# Patient Record
Sex: FEMALE | Race: WHITE | NOT HISPANIC OR LATINO | Employment: OTHER | URBAN - METROPOLITAN AREA
[De-identification: names, ages, dates, MRNs, and addresses within clinical notes are randomized per-mention and may not be internally consistent; named-entity substitution may affect disease eponyms.]

---

## 2017-02-09 ENCOUNTER — PREPPED CHART (OUTPATIENT)
Dept: URBAN - METROPOLITAN AREA CLINIC 39 | Facility: CLINIC | Age: 82
End: 2017-02-09

## 2017-03-24 ASSESSMENT — VISUAL ACUITY
OD_SC: 20/40
OS_SC: 20/40

## 2017-03-24 ASSESSMENT — TONOMETRY: OS_IOP_MMHG: 13

## 2017-03-28 ENCOUNTER — ESTABLISHED PATIENT (OUTPATIENT)
Dept: URBAN - METROPOLITAN AREA CLINIC 39 | Facility: CLINIC | Age: 82
End: 2017-03-28

## 2017-03-28 DIAGNOSIS — Z96.1: ICD-10-CM

## 2017-03-28 DIAGNOSIS — H35.3212: ICD-10-CM

## 2017-03-28 DIAGNOSIS — H40.1133: ICD-10-CM

## 2017-03-28 DIAGNOSIS — H35.3221: ICD-10-CM

## 2017-03-28 DIAGNOSIS — H43.812: ICD-10-CM

## 2017-03-28 PROCEDURE — 0517F GLAUCOMA PLAN OF CARE DOCD: CPT

## 2017-03-28 PROCEDURE — G8428 CUR MEDS NOT DOCUMENT: HCPCS

## 2017-03-28 PROCEDURE — 4177F TALK PT/CRGVR RE AREDS PREV: CPT

## 2017-03-28 PROCEDURE — 2027F OPTIC NERVE HEAD EVAL DONE: CPT

## 2017-03-28 PROCEDURE — 92012 INTRM OPH EXAM EST PATIENT: CPT

## 2017-03-28 PROCEDURE — G8756 NO BP MEASURE DOC: HCPCS

## 2017-03-28 PROCEDURE — 3284F IOP RED >=15% PRE-NTRV LVL: CPT

## 2017-03-28 PROCEDURE — 92134 CPTRZ OPH DX IMG PST SGM RTA: CPT

## 2017-03-28 PROCEDURE — 1036F TOBACCO NON-USER: CPT

## 2017-03-28 PROCEDURE — 3285F IOP DOWN <15% OF PRE-SVC LVL: CPT

## 2017-03-28 PROCEDURE — 2019F DILATED MACUL EXAM DONE: CPT

## 2017-03-28 ASSESSMENT — TONOMETRY
OS_IOP_MMHG: 14
OD_IOP_MMHG: 16

## 2017-03-28 ASSESSMENT — VISUAL ACUITY
OS_PH: 20/50-2
OD_SC: 20/40+2
OS_SC: J8
OS_SC: 20/200+1

## 2017-04-13 ENCOUNTER — ESTABLISHED PATIENT (OUTPATIENT)
Dept: URBAN - METROPOLITAN AREA CLINIC 39 | Facility: CLINIC | Age: 82
End: 2017-04-13

## 2017-04-13 DIAGNOSIS — H35.3211: ICD-10-CM

## 2017-04-13 PROCEDURE — 67028 INJECTION EYE DRUG: CPT

## 2017-04-13 ASSESSMENT — VISUAL ACUITY
OD_SC: 20/50+2
OD_SC: J12
OS_SC: 20/70-2
OS_SC: J4
OS_PH: 20/40-2

## 2017-04-13 ASSESSMENT — TONOMETRY: OD_IOP_MMHG: 14

## 2017-05-24 ENCOUNTER — ESTABLISHED PATIENT (OUTPATIENT)
Dept: URBAN - METROPOLITAN AREA CLINIC 39 | Facility: CLINIC | Age: 82
End: 2017-05-24

## 2017-05-24 DIAGNOSIS — H40.1133: ICD-10-CM

## 2017-05-24 DIAGNOSIS — H43.812: ICD-10-CM

## 2017-05-24 DIAGNOSIS — H35.3212: ICD-10-CM

## 2017-05-24 DIAGNOSIS — H35.3221: ICD-10-CM

## 2017-05-24 PROCEDURE — 92012 INTRM OPH EXAM EST PATIENT: CPT

## 2017-05-24 PROCEDURE — 9222650 BILAT EXTENDED OPHTHALMOSCOPY, F/U

## 2017-05-24 PROCEDURE — 92134 CPTRZ OPH DX IMG PST SGM RTA: CPT

## 2017-05-24 PROCEDURE — 67028 INJECTION EYE DRUG: CPT

## 2017-05-24 ASSESSMENT — TONOMETRY
OD_IOP_MMHG: 15
OS_IOP_MMHG: 16

## 2017-05-24 ASSESSMENT — VISUAL ACUITY
OD_SC: 20/30-2
OS_SC: 20/50

## 2017-06-21 ENCOUNTER — ESTABLISHED PATIENT (OUTPATIENT)
Dept: URBAN - METROPOLITAN AREA CLINIC 39 | Facility: CLINIC | Age: 82
End: 2017-06-21

## 2017-06-21 DIAGNOSIS — H35.3212: ICD-10-CM

## 2017-06-21 DIAGNOSIS — H35.3221: ICD-10-CM

## 2017-06-21 PROCEDURE — 6702850 BILATERAL INTRAVITREAL INJECTION

## 2017-06-21 ASSESSMENT — TONOMETRY
OD_IOP_MMHG: 10
OS_IOP_MMHG: 12

## 2017-06-21 ASSESSMENT — VISUAL ACUITY
OD_SC: 20/30-1+1
OS_SC: 20/80-1
OS_PH: 20/40+1

## 2017-08-02 ENCOUNTER — ESTABLISHED PATIENT (OUTPATIENT)
Dept: URBAN - METROPOLITAN AREA CLINIC 39 | Facility: CLINIC | Age: 82
End: 2017-08-02

## 2017-08-02 DIAGNOSIS — H43.812: ICD-10-CM

## 2017-08-02 DIAGNOSIS — H35.3212: ICD-10-CM

## 2017-08-02 PROCEDURE — 92012 INTRM OPH EXAM EST PATIENT: CPT

## 2017-08-02 PROCEDURE — 92134 CPTRZ OPH DX IMG PST SGM RTA: CPT

## 2017-08-02 ASSESSMENT — VISUAL ACUITY
OS_PH: 20/40-1
OS_SC: 20/70-1
OD_SC: 20/30-2

## 2017-08-02 ASSESSMENT — TONOMETRY
OD_IOP_MMHG: 12
OS_IOP_MMHG: 13

## 2017-08-21 ENCOUNTER — ESTABLISHED PATIENT (OUTPATIENT)
Dept: URBAN - METROPOLITAN AREA CLINIC 35 | Facility: CLINIC | Age: 82
End: 2017-08-21

## 2017-08-21 DIAGNOSIS — H43.812: ICD-10-CM

## 2017-08-21 DIAGNOSIS — H35.3211: ICD-10-CM

## 2017-08-21 DIAGNOSIS — H35.3221: ICD-10-CM

## 2017-08-21 PROCEDURE — 92134 CPTRZ OPH DX IMG PST SGM RTA: CPT

## 2017-08-21 PROCEDURE — 6702850 BILATERAL INTRAVITREAL INJECTION

## 2017-08-21 PROCEDURE — 92014 COMPRE OPH EXAM EST PT 1/>: CPT

## 2017-08-21 ASSESSMENT — VISUAL ACUITY
OD_SC: 20/30-1
OS_SC: 20/80
OS_PH: 20/40+1

## 2017-08-21 ASSESSMENT — TONOMETRY
OS_IOP_MMHG: 19
OD_IOP_MMHG: 14

## 2017-10-23 ENCOUNTER — ESTABLISHED PATIENT (OUTPATIENT)
Dept: URBAN - METROPOLITAN AREA CLINIC 39 | Facility: CLINIC | Age: 82
End: 2017-10-23

## 2017-10-23 DIAGNOSIS — H35.3212: ICD-10-CM

## 2017-10-23 DIAGNOSIS — H35.363: ICD-10-CM

## 2017-10-23 DIAGNOSIS — H43.812: ICD-10-CM

## 2017-10-23 DIAGNOSIS — H35.3221: ICD-10-CM

## 2017-10-23 PROCEDURE — 6702850 BILATERAL INTRAVITREAL INJECTION

## 2017-10-23 PROCEDURE — 92134 CPTRZ OPH DX IMG PST SGM RTA: CPT

## 2017-10-23 PROCEDURE — 9222650 BILAT EXTENDED OPHTHALMOSCOPY, F/U

## 2017-10-23 PROCEDURE — 92012 INTRM OPH EXAM EST PATIENT: CPT

## 2017-10-23 ASSESSMENT — VISUAL ACUITY
OD_SC: 20/40
OS_SC: J6
OS_PH: 20/50-2
OS_SC: 20/80-1
OD_SC: J10
OD_PH: 20/30-2

## 2017-10-23 ASSESSMENT — TONOMETRY
OS_IOP_MMHG: 16
OD_IOP_MMHG: 13

## 2017-10-24 ENCOUNTER — IOP CHECK (OUTPATIENT)
Dept: URBAN - METROPOLITAN AREA CLINIC 35 | Facility: CLINIC | Age: 82
End: 2017-10-24

## 2017-10-24 DIAGNOSIS — H40.1133: ICD-10-CM

## 2017-10-24 PROCEDURE — 92012 INTRM OPH EXAM EST PATIENT: CPT

## 2017-10-24 PROCEDURE — 92083 EXTENDED VISUAL FIELD XM: CPT

## 2017-10-24 ASSESSMENT — VISUAL ACUITY
OS_SC: 20/80
OD_PH: 20/30
OS_PH: 20/50-2
OD_SC: 20/40

## 2017-10-24 ASSESSMENT — TONOMETRY
OS_IOP_MMHG: 14
OD_IOP_MMHG: 12

## 2017-11-20 ENCOUNTER — ESTABLISHED PATIENT (OUTPATIENT)
Dept: URBAN - METROPOLITAN AREA CLINIC 39 | Facility: CLINIC | Age: 82
End: 2017-11-20

## 2017-11-20 DIAGNOSIS — H35.3221: ICD-10-CM

## 2017-11-20 DIAGNOSIS — H35.3212: ICD-10-CM

## 2017-11-20 DIAGNOSIS — H35.363: ICD-10-CM

## 2017-11-20 DIAGNOSIS — H43.812: ICD-10-CM

## 2017-11-20 PROCEDURE — 9222650 BILAT EXTENDED OPHTHALMOSCOPY, F/U

## 2017-11-20 PROCEDURE — 92134 CPTRZ OPH DX IMG PST SGM RTA: CPT

## 2017-11-20 PROCEDURE — 67028 INJECTION EYE DRUG: CPT

## 2017-11-20 PROCEDURE — 92012 INTRM OPH EXAM EST PATIENT: CPT

## 2017-11-20 ASSESSMENT — VISUAL ACUITY
OD_SC: J10
OS_SC: 20/200
OS_SC: J4
OS_PH: 20/50-1
OD_SC: 20/40

## 2017-11-20 ASSESSMENT — TONOMETRY: OD_IOP_MMHG: 12

## 2018-01-05 ENCOUNTER — ESTABLISHED PATIENT (OUTPATIENT)
Dept: URBAN - METROPOLITAN AREA CLINIC 39 | Facility: CLINIC | Age: 83
End: 2018-01-05

## 2018-01-05 DIAGNOSIS — H43.812: ICD-10-CM

## 2018-01-05 DIAGNOSIS — H35.3221: ICD-10-CM

## 2018-01-05 DIAGNOSIS — H35.3211: ICD-10-CM

## 2018-01-05 DIAGNOSIS — H35.363: ICD-10-CM

## 2018-01-05 PROCEDURE — 92134 CPTRZ OPH DX IMG PST SGM RTA: CPT

## 2018-01-05 PROCEDURE — 9222650 BILAT EXTENDED OPHTHALMOSCOPY, F/U

## 2018-01-05 PROCEDURE — 92014 COMPRE OPH EXAM EST PT 1/>: CPT

## 2018-01-05 PROCEDURE — 6702850 BILATERAL INTRAVITREAL INJECTION

## 2018-01-05 ASSESSMENT — VISUAL ACUITY
OS_SC: J4
OD_SC: 20/30-2
OD_SC: J12
OS_PH: 20/50+1
OS_SC: 20/100-1

## 2018-01-05 ASSESSMENT — TONOMETRY
OD_IOP_MMHG: 15
OS_IOP_MMHG: 15

## 2018-02-13 ENCOUNTER — IOP CHECK (OUTPATIENT)
Dept: URBAN - METROPOLITAN AREA CLINIC 35 | Facility: CLINIC | Age: 83
End: 2018-02-13

## 2018-02-13 DIAGNOSIS — H40.1133: ICD-10-CM

## 2018-02-13 PROCEDURE — 92012 INTRM OPH EXAM EST PATIENT: CPT

## 2018-02-13 PROCEDURE — 92133 CPTRZD OPH DX IMG PST SGM ON: CPT

## 2018-02-13 ASSESSMENT — TONOMETRY
OD_IOP_MMHG: 10
OS_IOP_MMHG: 10

## 2018-02-13 ASSESSMENT — VISUAL ACUITY
OD_CC: J12
OD_SC: 20/30-2
OS_PH: 20/50+2
OS_CC: J4
OS_SC: 20/80+1

## 2018-02-15 ENCOUNTER — DILATED FUNDUS EXAM (OUTPATIENT)
Dept: URBAN - METROPOLITAN AREA CLINIC 35 | Facility: CLINIC | Age: 83
End: 2018-02-15

## 2018-02-15 DIAGNOSIS — H35.3221: ICD-10-CM

## 2018-02-15 DIAGNOSIS — H35.3211: ICD-10-CM

## 2018-02-15 DIAGNOSIS — H40.1133: ICD-10-CM

## 2018-02-15 DIAGNOSIS — H35.363: ICD-10-CM

## 2018-02-15 PROCEDURE — 6702850 BILATERAL INTRAVITREAL INJECTION

## 2018-02-15 PROCEDURE — 92012 INTRM OPH EXAM EST PATIENT: CPT

## 2018-02-15 PROCEDURE — 92134 CPTRZ OPH DX IMG PST SGM RTA: CPT

## 2018-02-15 PROCEDURE — 9222650 BILAT EXTENDED OPHTHALMOSCOPY, F/U

## 2018-02-15 ASSESSMENT — VISUAL ACUITY
OS_SC: J1
OD_SC: 20/30-2
OD_SC: J10
OU_SC: J1
OS_PH: 20/50
OU_SC: 20/30-2
OS_SC: 20/200
OD_PH: 20/30

## 2018-02-15 ASSESSMENT — TONOMETRY
OD_IOP_MMHG: 14
OS_IOP_MMHG: 14

## 2018-04-04 ENCOUNTER — ESTABLISHED PATIENT (OUTPATIENT)
Dept: URBAN - METROPOLITAN AREA CLINIC 39 | Facility: CLINIC | Age: 83
End: 2018-04-04

## 2018-04-04 ASSESSMENT — VISUAL ACUITY
OS_CC: 20/30-2
OD_CC: 20/50+1

## 2018-04-04 ASSESSMENT — TONOMETRY
OD_IOP_MMHG: 11
OS_IOP_MMHG: 11

## 2018-04-09 ENCOUNTER — ESTABLISHED PATIENT (OUTPATIENT)
Dept: URBAN - METROPOLITAN AREA CLINIC 39 | Facility: CLINIC | Age: 83
End: 2018-04-09

## 2018-04-09 DIAGNOSIS — H35.3211: ICD-10-CM

## 2018-04-09 DIAGNOSIS — H40.1133: ICD-10-CM

## 2018-04-09 DIAGNOSIS — H35.3221: ICD-10-CM

## 2018-04-09 DIAGNOSIS — S05.10XD: ICD-10-CM

## 2018-04-09 DIAGNOSIS — H35.363: ICD-10-CM

## 2018-04-09 PROCEDURE — 92012 INTRM OPH EXAM EST PATIENT: CPT

## 2018-04-09 PROCEDURE — 6702850 BILATERAL INTRAVITREAL INJECTION

## 2018-04-09 PROCEDURE — 92250 FUNDUS PHOTOGRAPHY W/I&R: CPT

## 2018-04-09 PROCEDURE — 92134 CPTRZ OPH DX IMG PST SGM RTA: CPT

## 2018-04-09 PROCEDURE — 92235 FLUORESCEIN ANGRPH MLTIFRAME: CPT

## 2018-04-09 ASSESSMENT — TONOMETRY
OD_IOP_MMHG: 15
OS_IOP_MMHG: 14

## 2018-04-09 ASSESSMENT — VISUAL ACUITY
OD_CC: 20/30-1
OS_CC: 20/25+1

## 2018-05-24 ENCOUNTER — ESTABLISHED PATIENT (OUTPATIENT)
Dept: URBAN - METROPOLITAN AREA CLINIC 35 | Facility: CLINIC | Age: 83
End: 2018-05-24

## 2018-05-24 DIAGNOSIS — H35.3211: ICD-10-CM

## 2018-05-24 DIAGNOSIS — H43.812: ICD-10-CM

## 2018-05-24 DIAGNOSIS — H35.3221: ICD-10-CM

## 2018-05-24 DIAGNOSIS — H35.733: ICD-10-CM

## 2018-05-24 DIAGNOSIS — H35.363: ICD-10-CM

## 2018-05-24 PROCEDURE — 6702850 BILATERAL INTRAVITREAL INJECTION

## 2018-05-24 PROCEDURE — 92012 INTRM OPH EXAM EST PATIENT: CPT

## 2018-05-24 PROCEDURE — 9222650 BILAT EXTENDED OPHTHALMOSCOPY, F/U

## 2018-05-24 PROCEDURE — 92134 CPTRZ OPH DX IMG PST SGM RTA: CPT

## 2018-05-24 ASSESSMENT — TONOMETRY
OD_IOP_MMHG: 11
OS_IOP_MMHG: 13

## 2018-05-24 ASSESSMENT — VISUAL ACUITY
OS_CC: 20/30+1
OD_CC: 20/30-2

## 2018-06-19 ENCOUNTER — ESTABLISHED COMPREHENSIVE EXAM (OUTPATIENT)
Dept: URBAN - METROPOLITAN AREA CLINIC 35 | Facility: CLINIC | Age: 83
End: 2018-06-19

## 2018-06-19 DIAGNOSIS — H35.363: ICD-10-CM

## 2018-06-19 DIAGNOSIS — S05.10XD: ICD-10-CM

## 2018-06-19 DIAGNOSIS — H40.1133: ICD-10-CM

## 2018-06-19 PROCEDURE — 92250 FUNDUS PHOTOGRAPHY W/I&R: CPT

## 2018-06-19 PROCEDURE — 92014 COMPRE OPH EXAM EST PT 1/>: CPT

## 2018-06-19 PROCEDURE — 9222650 BILAT EXTENDED OPHTHALMOSCOPY, F/U

## 2018-06-19 ASSESSMENT — VISUAL ACUITY
OS_CC: 20/25-2
OD_CC: 20/40

## 2018-06-19 ASSESSMENT — TONOMETRY
OS_IOP_MMHG: 12
OD_IOP_MMHG: 09

## 2018-07-19 ENCOUNTER — ESTABLISHED PATIENT (OUTPATIENT)
Dept: URBAN - METROPOLITAN AREA CLINIC 35 | Facility: CLINIC | Age: 83
End: 2018-07-19

## 2018-07-19 DIAGNOSIS — H35.3221: ICD-10-CM

## 2018-07-19 DIAGNOSIS — H35.3211: ICD-10-CM

## 2018-07-19 DIAGNOSIS — H35.363: ICD-10-CM

## 2018-07-19 DIAGNOSIS — H43.812: ICD-10-CM

## 2018-07-19 DIAGNOSIS — S05.10XD: ICD-10-CM

## 2018-07-19 PROCEDURE — 92012 INTRM OPH EXAM EST PATIENT: CPT

## 2018-07-19 PROCEDURE — 92235 FLUORESCEIN ANGRPH MLTIFRAME: CPT

## 2018-07-19 PROCEDURE — 9222650 BILAT EXTENDED OPHTHALMOSCOPY, F/U

## 2018-07-19 PROCEDURE — 92250 FUNDUS PHOTOGRAPHY W/I&R: CPT

## 2018-07-19 PROCEDURE — 6702850 BILATERAL INTRAVITREAL INJECTION

## 2018-07-19 ASSESSMENT — TONOMETRY
OS_IOP_MMHG: 12
OD_IOP_MMHG: 10

## 2018-07-19 ASSESSMENT — VISUAL ACUITY
OD_CC: 20/40-2
OD_CC: J5
OS_CC: J3
OS_CC: 20/25-2

## 2018-09-27 ENCOUNTER — ESTABLISHED PATIENT (OUTPATIENT)
Dept: URBAN - METROPOLITAN AREA CLINIC 35 | Facility: CLINIC | Age: 83
End: 2018-09-27

## 2018-09-27 DIAGNOSIS — H35.733: ICD-10-CM

## 2018-09-27 DIAGNOSIS — H35.363: ICD-10-CM

## 2018-09-27 DIAGNOSIS — H35.3211: ICD-10-CM

## 2018-09-27 DIAGNOSIS — H43.812: ICD-10-CM

## 2018-09-27 DIAGNOSIS — S05.10XD: ICD-10-CM

## 2018-09-27 DIAGNOSIS — H35.3221: ICD-10-CM

## 2018-09-27 PROCEDURE — 92235 FLUORESCEIN ANGRPH MLTIFRAME: CPT

## 2018-09-27 PROCEDURE — 92012 INTRM OPH EXAM EST PATIENT: CPT

## 2018-09-27 PROCEDURE — 6702850 BILATERAL INTRAVITREAL INJECTION

## 2018-09-27 PROCEDURE — 92250 FUNDUS PHOTOGRAPHY W/I&R: CPT

## 2018-09-27 PROCEDURE — 92134 CPTRZ OPH DX IMG PST SGM RTA: CPT

## 2018-09-27 PROCEDURE — 9222650 BILAT EXTENDED OPHTHALMOSCOPY, F/U

## 2018-09-27 ASSESSMENT — TONOMETRY
OS_IOP_MMHG: 12
OD_IOP_MMHG: 12

## 2018-09-27 ASSESSMENT — VISUAL ACUITY
OS_SC: 20/25+1
OD_SC: 20/40-1

## 2018-10-23 ENCOUNTER — IOP CHECK (OUTPATIENT)
Dept: URBAN - METROPOLITAN AREA CLINIC 35 | Facility: CLINIC | Age: 83
End: 2018-10-23

## 2018-10-23 DIAGNOSIS — H40.1133: ICD-10-CM

## 2018-10-23 PROCEDURE — 92083 EXTENDED VISUAL FIELD XM: CPT

## 2018-10-23 PROCEDURE — 92012 INTRM OPH EXAM EST PATIENT: CPT

## 2018-10-23 ASSESSMENT — VISUAL ACUITY
OD_CC: J4
OD_CC: 20/30-
OS_CC: J1
OS_CC: 20/25+1

## 2018-10-23 ASSESSMENT — TONOMETRY
OD_IOP_MMHG: 13
OS_IOP_MMHG: 13

## 2018-11-01 NOTE — PATIENT DISCUSSION
PLAN: XEN-45 W/[facility] OS, NEEDS VAN. PT WISHES TO WAIT UNTIL JANUARY TO HAVE SX. EXPLAINED THE LONGER WE WAIT, MORE DAMAGE OCCURS. PT UNDERSTANDS. ALL QUESTIONS ANSWERED.

## 2018-11-19 ENCOUNTER — ESTABLISHED COMPREHENSIVE EXAM (OUTPATIENT)
Dept: URBAN - METROPOLITAN AREA CLINIC 39 | Facility: CLINIC | Age: 83
End: 2018-11-19

## 2018-11-19 DIAGNOSIS — H35.733: ICD-10-CM

## 2018-11-19 DIAGNOSIS — H43.812: ICD-10-CM

## 2018-11-19 DIAGNOSIS — H35.3221: ICD-10-CM

## 2018-11-19 DIAGNOSIS — H35.3211: ICD-10-CM

## 2018-11-19 DIAGNOSIS — H35.363: ICD-10-CM

## 2018-11-19 PROCEDURE — 92134 CPTRZ OPH DX IMG PST SGM RTA: CPT

## 2018-11-19 PROCEDURE — 92012 INTRM OPH EXAM EST PATIENT: CPT

## 2018-11-19 PROCEDURE — 9222650 BILAT EXTENDED OPHTHALMOSCOPY, F/U

## 2018-11-19 PROCEDURE — 6702850 BILATERAL INTRAVITREAL INJECTION

## 2018-11-19 ASSESSMENT — TONOMETRY
OS_IOP_MMHG: 12
OD_IOP_MMHG: 10

## 2018-11-19 ASSESSMENT — VISUAL ACUITY
OS_SC: 20/30
OD_SC: 20/30+2

## 2019-01-10 ENCOUNTER — ESTABLISHED PATIENT (OUTPATIENT)
Dept: URBAN - METROPOLITAN AREA CLINIC 35 | Facility: CLINIC | Age: 84
End: 2019-01-10

## 2019-01-10 DIAGNOSIS — H43.812: ICD-10-CM

## 2019-01-10 DIAGNOSIS — H35.3221: ICD-10-CM

## 2019-01-10 DIAGNOSIS — S05.10XD: ICD-10-CM

## 2019-01-10 DIAGNOSIS — H35.3211: ICD-10-CM

## 2019-01-10 DIAGNOSIS — H35.733: ICD-10-CM

## 2019-01-10 DIAGNOSIS — H35.363: ICD-10-CM

## 2019-01-10 PROCEDURE — 6702850 BILATERAL INTRAVITREAL INJECTION

## 2019-01-10 PROCEDURE — 92250 FUNDUS PHOTOGRAPHY W/I&R: CPT

## 2019-01-10 PROCEDURE — 92235 FLUORESCEIN ANGRPH MLTIFRAME: CPT

## 2019-01-10 PROCEDURE — 92012 INTRM OPH EXAM EST PATIENT: CPT

## 2019-01-10 ASSESSMENT — TONOMETRY
OD_IOP_MMHG: 11
OS_IOP_MMHG: 12

## 2019-01-10 ASSESSMENT — VISUAL ACUITY
OS_CC: 20/30
OD_CC: 20/40-2

## 2019-03-07 ENCOUNTER — ESTABLISHED PATIENT (OUTPATIENT)
Dept: URBAN - METROPOLITAN AREA CLINIC 35 | Facility: CLINIC | Age: 84
End: 2019-03-07

## 2019-03-07 DIAGNOSIS — H35.363: ICD-10-CM

## 2019-03-07 DIAGNOSIS — S05.10XD: ICD-10-CM

## 2019-03-07 DIAGNOSIS — H35.733: ICD-10-CM

## 2019-03-07 DIAGNOSIS — S05.12XA: ICD-10-CM

## 2019-03-07 DIAGNOSIS — H35.3211: ICD-10-CM

## 2019-03-07 DIAGNOSIS — H35.3221: ICD-10-CM

## 2019-03-07 DIAGNOSIS — H43.812: ICD-10-CM

## 2019-03-07 PROCEDURE — 6702850 BILATERAL INTRAVITREAL INJECTION

## 2019-03-07 PROCEDURE — 92235 FLUORESCEIN ANGRPH MLTIFRAME: CPT

## 2019-03-07 PROCEDURE — 92250 FUNDUS PHOTOGRAPHY W/I&R: CPT

## 2019-03-07 PROCEDURE — 92134 CPTRZ OPH DX IMG PST SGM RTA: CPT

## 2019-03-07 PROCEDURE — 92012 INTRM OPH EXAM EST PATIENT: CPT

## 2019-03-07 ASSESSMENT — VISUAL ACUITY
OD_CC: 20/40
OS_CC: 20/30-2

## 2019-03-07 ASSESSMENT — TONOMETRY
OD_IOP_MMHG: 20
OS_IOP_MMHG: 20

## 2019-03-26 ENCOUNTER — IOP CHECK (OUTPATIENT)
Dept: URBAN - METROPOLITAN AREA CLINIC 35 | Facility: CLINIC | Age: 84
End: 2019-03-26

## 2019-03-26 DIAGNOSIS — H35.30: ICD-10-CM

## 2019-03-26 DIAGNOSIS — S05.12XA: ICD-10-CM

## 2019-03-26 DIAGNOSIS — H35.733: ICD-10-CM

## 2019-03-26 DIAGNOSIS — H40.1133: ICD-10-CM

## 2019-03-26 DIAGNOSIS — S05.10XD: ICD-10-CM

## 2019-03-26 PROCEDURE — 92133 CPTRZD OPH DX IMG PST SGM ON: CPT

## 2019-03-26 PROCEDURE — 92012 INTRM OPH EXAM EST PATIENT: CPT

## 2019-03-26 ASSESSMENT — VISUAL ACUITY
OS_CC: 20/25-1
OD_CC: 20/40

## 2019-03-26 ASSESSMENT — TONOMETRY
OD_IOP_MMHG: 14
OS_IOP_MMHG: 13

## 2019-04-11 NOTE — PATIENT DISCUSSION
pt not cleared by cardiologist  for xen or trab sx until January ,needs better IOP control in OS so until able to have sx . Discussed option of Rhopressa study, if pt not a candidate will start Rhopressa.

## 2019-05-16 ENCOUNTER — ESTABLISHED PATIENT (OUTPATIENT)
Dept: URBAN - METROPOLITAN AREA CLINIC 35 | Facility: CLINIC | Age: 84
End: 2019-05-16

## 2019-05-16 DIAGNOSIS — H35.3221: ICD-10-CM

## 2019-05-16 DIAGNOSIS — H35.733: ICD-10-CM

## 2019-05-16 DIAGNOSIS — H35.3211: ICD-10-CM

## 2019-05-16 PROCEDURE — 6702850 BILATERAL INTRAVITREAL INJECTION

## 2019-05-16 PROCEDURE — 92134 CPTRZ OPH DX IMG PST SGM RTA: CPT

## 2019-05-16 PROCEDURE — 92250 FUNDUS PHOTOGRAPHY W/I&R: CPT

## 2019-05-16 PROCEDURE — 9222650 BILAT EXTENDED OPHTHALMOSCOPY, F/U

## 2019-05-16 PROCEDURE — 92235 FLUORESCEIN ANGRPH MLTIFRAME: CPT

## 2019-05-16 PROCEDURE — 92012 INTRM OPH EXAM EST PATIENT: CPT

## 2019-05-16 ASSESSMENT — VISUAL ACUITY
OD_CC: 20/50
OS_CC: 20/30+2

## 2019-05-16 ASSESSMENT — TONOMETRY
OD_IOP_MMHG: 13
OS_IOP_MMHG: 13

## 2019-07-30 ENCOUNTER — IOP CHECK (OUTPATIENT)
Dept: URBAN - METROPOLITAN AREA CLINIC 35 | Facility: CLINIC | Age: 84
End: 2019-07-30

## 2019-07-30 DIAGNOSIS — H40.1133: ICD-10-CM

## 2019-07-30 PROCEDURE — 92083 EXTENDED VISUAL FIELD XM: CPT

## 2019-07-30 PROCEDURE — 92012 INTRM OPH EXAM EST PATIENT: CPT

## 2019-07-30 ASSESSMENT — VISUAL ACUITY
OS_CC: 20/30
OD_CC: 20/50

## 2019-07-30 ASSESSMENT — TONOMETRY
OS_IOP_MMHG: 15
OD_IOP_MMHG: 15

## 2019-08-08 ENCOUNTER — ESTABLISHED PATIENT (OUTPATIENT)
Dept: URBAN - METROPOLITAN AREA CLINIC 35 | Facility: CLINIC | Age: 84
End: 2019-08-08

## 2019-08-08 DIAGNOSIS — H35.3211: ICD-10-CM

## 2019-08-08 DIAGNOSIS — H35.3221: ICD-10-CM

## 2019-08-08 PROCEDURE — 92012 INTRM OPH EXAM EST PATIENT: CPT

## 2019-08-08 PROCEDURE — 9222650 BILAT EXTENDED OPHTHALMOSCOPY, F/U

## 2019-08-08 PROCEDURE — 92134 CPTRZ OPH DX IMG PST SGM RTA: CPT

## 2019-08-08 PROCEDURE — 6702850 BILATERAL INTRAVITREAL INJECTION

## 2019-08-08 PROCEDURE — 92250 FUNDUS PHOTOGRAPHY W/I&R: CPT

## 2019-08-08 ASSESSMENT — TONOMETRY
OD_IOP_MMHG: 11
OS_IOP_MMHG: 13

## 2019-08-08 ASSESSMENT — VISUAL ACUITY
OD_CC: 20/60-2
OS_CC: 20/25-1

## 2019-08-08 NOTE — PATIENT DISCUSSION
Rhopressa not controlling IOP and pt not able to tolerate. On MTMT recommend xen gel stent OS . Pt to continue gtts as directed until sx if able to continue to tolerate Rhopressa.

## 2019-08-08 NOTE — PATIENT DISCUSSION
Medical clearance received Jan 2019. Pt denies any other cardiac events since then although pt.'s BP is up today . Confirmed with 2 readings. May be a result of having just taken BP med before today's appt and apprehension regarding upcoming sx. Pt is advised to monitor BP at home and knows that if BP is too high day of sx it will be cancelled.

## 2019-08-27 NOTE — PROCEDURE NOTE: CLINICAL
PROCEDURE NOTE: Bleb Needling OS. Diagnosis: POAG, Severe. Prior to treatment, risks/benefits/alternatives discussed including infection, loss of vision, hemorrhage, cataract, glaucoma, retinal tears or detachment. The patient was brought to the minor operative suite and was prepped and draped in the usual sterile fashion. A lid speculum was placed in the operative eye. Topical Tertacaine and subconjuctival Lidocaine were administered. A 27 gauge needle was used to break up subconjunctival scar tissue and to reform the bleb. 0.4 mg/ml Mitomycin was diluted by 50% with Lidocaine and injected subconjunctivally above the failing bleb. A cotton swab was used to spread the Mitomycin. The wound was inspected and found to be water-tight. Patient tolerated procedure well. There were no complications. Post procedure instructions given. Yue Ramires

## 2019-09-03 NOTE — PATIENT DISCUSSION
Rebbeca Rim is looking much better and IOP is much better also Hyphema is resolving and should go away on its own. Will continue durezol QID OS.

## 2019-09-24 NOTE — PATIENT DISCUSSION
FLAT BLEB WITH SMALL BLEB LEAK. TCNR IS OFFICE TODAY IN OS. USE THE VIGAMOX QID FOR 1 DAYS THEN STOP. CONTINUE DUREZOL QID. NO EYE RUBBING, SHIELD QHS. 24-7 E.R DOCTOR.

## 2019-09-24 NOTE — PROCEDURE NOTE: CLINICAL
PROCEDURE NOTE: Bleb Needling OS. Diagnosis: POAG, Severe. Prior to treatment, risks/benefits/alternatives discussed including infection, loss of vision, hemorrhage, cataract, glaucoma, retinal tears or detachment. The patient was brought to the minor operative suite and was prepped and draped in the usual sterile fashion. A lid speculum was placed in the operative eye. Topical Tertacaine and subconjuctival Lidocaine were administered. A 27 gauge needle was used to break up subconjunctival scar tissue and to reform the bleb. 0.4 mg/ml Mitomycin was diluted by 50% with Lidocaine and injected subconjunctivally above the failing bleb. A cotton swab was used to spread the Mitomycin. The wound was inspected and found to be water-tight. Patient tolerated procedure well. There were no complications. Post procedure instructions given. Queen Negro

## 2019-10-31 ENCOUNTER — ESTABLISHED PATIENT (OUTPATIENT)
Dept: URBAN - METROPOLITAN AREA CLINIC 35 | Facility: CLINIC | Age: 84
End: 2019-10-31

## 2019-10-31 DIAGNOSIS — H43.812: ICD-10-CM

## 2019-10-31 DIAGNOSIS — H35.3221: ICD-10-CM

## 2019-10-31 DIAGNOSIS — H35.733: ICD-10-CM

## 2019-10-31 DIAGNOSIS — H35.3211: ICD-10-CM

## 2019-10-31 DIAGNOSIS — H35.363: ICD-10-CM

## 2019-10-31 PROCEDURE — 92012 INTRM OPH EXAM EST PATIENT: CPT

## 2019-10-31 PROCEDURE — 92134 CPTRZ OPH DX IMG PST SGM RTA: CPT

## 2019-10-31 PROCEDURE — 9222650 BILAT EXTENDED OPHTHALMOSCOPY, F/U

## 2019-10-31 PROCEDURE — 92250 FUNDUS PHOTOGRAPHY W/I&R: CPT

## 2019-10-31 PROCEDURE — 92235 FLUORESCEIN ANGRPH MLTIFRAME: CPT

## 2019-10-31 PROCEDURE — 6702850 BILATERAL INTRAVITREAL INJECTION

## 2019-10-31 ASSESSMENT — TONOMETRY
OD_IOP_MMHG: 13
OS_IOP_MMHG: 15

## 2019-10-31 ASSESSMENT — VISUAL ACUITY
OS_CC: 20/25-1
OD_CC: 20/50-1

## 2019-11-26 NOTE — PATIENT DISCUSSION
PT DEVELOPING SCAR TISSUE, DISCUSSED OPTIONS OF REPEATING THE TCNR VS. TRAB W/[facility] OS. PLAN: TCNR IN OFFICE TODAY, NO COMPLICATIONS. TA 17 @ 8:30.

## 2020-01-06 NOTE — PATIENT DISCUSSION
IOP still elevated OS, scar tissue developed.  Rec: Trab vs Tube OS.  Decrease Durezol to BID OS.  Add Latanoprost and Combigan to OS.  Pt would like to schedule after the holidays.

## 2020-01-07 NOTE — PATIENT DISCUSSION
HOLD COMBIGAN AND LATANOPROST IN OS. CONTINUE DUREZOL AND VIGAMOX QID OS. NO EYE RUBBING, SHIELD QHS. 24-7 E.R DOCTOR.

## 2020-03-05 NOTE — PATIENT DISCUSSION
TA AFTER MASSAGE 15. PT MAY NEED LOWER IOP IN OS. DISCUSSED OPTIONS. +/- TCNR NEXT VISIT IF IOP ELEVATED. TARGET LOW TEENS. CONTINUE DUREZOL QID. NO EYE RUBBING.

## 2020-03-24 NOTE — PATIENT DISCUSSION
Pt most likely needs lower IOP OS but recommend monitoring for now and will have pt RTC in one month to re evaluate. Pt to sharad Durezol TID for 2 weeks then BID until she returns.

## 2020-05-26 NOTE — PATIENT DISCUSSION
Recommend TCNR OS today.  1 gtt ofloxacin given before and after.  TA after = 08.  Continue Durezol tid, shield qhs, no eye rubbing.

## 2020-05-26 NOTE — PROCEDURE NOTE: CLINICAL
PROCEDURE NOTE: Bleb Needling #1 OS. Diagnosis: POAG, Severe. Prior to treatment, risks/benefits/alternatives discussed including infection, loss of vision, hemorrhage, cataract, glaucoma, retinal tears or detachment. The patient was brought to the minor operative suite and was prepped and draped in the usual sterile fashion. A lid speculum was placed in the operative eye. Topical Tertacaine and subconjuctival Lidocaine were administered. A 27 gauge needle was used to break up subconjunctival scar tissue and to reform the bleb. 0.4 mg/ml Mitomycin was diluted by 50% with Lidocaine and injected subconjunctivally above the failing bleb. A cotton swab was used to spread the Mitomycin. The wound was inspected and found to be water-tight. Patient tolerated procedure well. There were no complications. Post procedure instructions given. Michelle Rush

## 2020-06-09 NOTE — PATIENT DISCUSSION
SCAR TISSUE IS DEVELOPING IN OS. MAY NEED MORE TCNR IN OFFICE IN FUTURE. DECREASE DUREZOL TO BID IN OS UNTIL NEXT VISIT. WILL TRIM SUTURE W/ARGON LASER TODAY.

## 2020-07-02 NOTE — PROCEDURE NOTE: CLINICAL
PROCEDURE NOTE: Bleb Needling #2 OS. Diagnosis: POAG, Severe. Prior to treatment, risks/benefits/alternatives discussed including infection, loss of vision, hemorrhage, cataract, glaucoma, retinal tears or detachment. The patient was brought to the minor operative suite and was prepped and draped in the usual sterile fashion. A lid speculum was placed in the operative eye. Topical Tertacaine and subconjuctival Lidocaine were administered. A 27 gauge needle was used to break up subconjunctival scar tissue and to reform the bleb. 0.4 mg/ml Mitomycin was diluted by 50% with Lidocaine and injected subconjunctivally above the failing bleb. A cotton swab was used to spread the Mitomycin. The wound was inspected and found to be water-tight. Patient tolerated procedure well. There were no complications. Post procedure instructions given. Karen Balbuena

## 2020-07-02 NOTE — PATIENT DISCUSSION
REC: LOWER IOP OS. PLAN: TCNR IN OFFICE TODAY. NO COMPLICATIONS. TA 02 AFTERWARDS. CONTINUE DUREZOL BID IN OS. ALL RESTRICTIONS APPLY AT THIS TIME.

## 2020-11-24 NOTE — PATIENT DISCUSSION
PT STABLE WILL REFER BACK TO DR Magda Breen FOR ONGOING CARE AND YEARLY VF'S AND OCT'S.  CONTINUE PRESENT MED ON OD.

## 2021-09-23 NOTE — PATIENT DISCUSSION
Discussed condition and exacerbating conditions/situations (e.g., dry/arid environments, overhead fans, air conditioners, side effect of medications).
Discussed lid hygiene, warm compress and eyelid wash.
Good postoperative appearance.
IOP still elevated OS, scar tissue developed.  Rec: Trab vs Tube OS.  Decrease Durezol to BID OS.  Add Latanoprost and Combigan to OS.  Pt would like to schedule after the holidays.
Monitor.
PLAN: TRAB vs TUBE OS, NEEDS VAN.
Patient achieved a 15% reduction in IOP from pretreatment levels or a plan is in place to achieve this goal.
Patient understands condition, prognosis and need for follow up care.
Recommended artificial tears to use: 1 drop 4x a day in both eyes.
Stable. BP control.
all other ROS negative except as per HPI